# Patient Record
Sex: FEMALE | Race: WHITE
[De-identification: names, ages, dates, MRNs, and addresses within clinical notes are randomized per-mention and may not be internally consistent; named-entity substitution may affect disease eponyms.]

---

## 2017-10-20 ENCOUNTER — HOSPITAL ENCOUNTER (OUTPATIENT)
Dept: HOSPITAL 75 - RAD | Age: 62
End: 2017-10-20
Attending: FAMILY MEDICINE
Payer: COMMERCIAL

## 2017-10-20 DIAGNOSIS — Z12.31: Primary | ICD-10-CM

## 2017-10-20 PROCEDURE — 77067 SCR MAMMO BI INCL CAD: CPT

## 2017-10-23 NOTE — DIAGNOSTIC IMAGING REPORT
EXAMINATION:

Bilateral screening mammogram 2D views with tomosynthesis. The

current study was also evaluated with a Computer Aided Detection

(CAD) system.



INDICATION: 

Screening.



PERSONAL HISTORY: 

No current complaints stated on the questionnaire.



COMPARISON: 

12/21/2015.



FINDINGS: 

The breasts are composed of scattered fibroglandular densities.

There are no masses, architectural distortion, or suspicious

cluster of calcifications. Allowing for technique and positional

differences, no suspicious change is seen.



IMPRESSION: 

No significant change.



ACR BI-RADS Category 2: Benign findings.

Result letter will be mailed to the patient.

Note: At least 10% of breast cancer is not imaged by mammography.



 



Dictated by: 



  Dictated on workstation # [id number]

## 2019-03-28 ENCOUNTER — HOSPITAL ENCOUNTER (OUTPATIENT)
Dept: HOSPITAL 75 - RAD | Age: 64
End: 2019-03-28
Attending: FAMILY MEDICINE
Payer: COMMERCIAL

## 2019-03-28 DIAGNOSIS — Z12.31: Primary | ICD-10-CM

## 2019-03-28 PROCEDURE — 77067 SCR MAMMO BI INCL CAD: CPT

## 2019-03-28 NOTE — DIAGNOSTIC IMAGING REPORT
INDICATION: Routine screening.



COMPARISON: Comparison is made with prior mammograms from

10/20/2017 and 12/21/2015.



TECHNIQUE: 2D and 3D bilateral screening mammography was

performed with computer-aided detection (CAD) system.



FINDINGS: Both breasts are heterogeneously dense, limiting the

sensitivity of mammography. The parenchymal pattern is stable. No

dominant mass or malignant-appearing microcalcifications are

seen. The axillae are unremarkable.



IMPRESSION: No mammographic features suspicious for malignancy

are identified.



ACR BI-RADS Category 1: Negative.

Result letter will be mailed to the patient.

Note:  At least 10% of breast cancer is not imaged by

mammography.



Dictated by: 



  Dictated on workstation # [id number]

## 2020-07-20 ENCOUNTER — HOSPITAL ENCOUNTER (OUTPATIENT)
Dept: HOSPITAL 75 - RAD | Age: 65
End: 2020-07-20
Attending: FAMILY MEDICINE
Payer: COMMERCIAL

## 2020-07-20 DIAGNOSIS — Z12.31: Primary | ICD-10-CM

## 2020-07-20 PROCEDURE — 77063 BREAST TOMOSYNTHESIS BI: CPT

## 2020-07-20 PROCEDURE — 77067 SCR MAMMO BI INCL CAD: CPT

## 2020-07-20 NOTE — DIAGNOSTIC IMAGING REPORT
INDICATION: 

Routine screening.



COMPARISON:  

03/28/2019 and 10/20/2017.



TECHNIQUE: 

2D and 3D bilateral screening mammography was performed with CAD.



FINDINGS:

Both breasts are heterogeneously dense, limiting the sensitivity

of mammography. No mass or malignant appearing

microcalcifications are seen. The axillae are unremarkable.



IMPRESSION: 

No mammographic features suspicious for malignancy are

identified.



ACR BI-RADS Category 1: Negative.

Result letter will be mailed to the patient.

Note:  At least 10% of breast cancer is not imaged by

mammography.



Dictated by: 



  Dictated on workstation # [id number]

## 2021-07-07 ENCOUNTER — HOSPITAL ENCOUNTER (OUTPATIENT)
Dept: HOSPITAL 75 - RAD | Age: 66
End: 2021-07-07
Attending: FAMILY MEDICINE
Payer: COMMERCIAL

## 2021-07-07 DIAGNOSIS — N28.1: Primary | ICD-10-CM

## 2021-07-07 PROCEDURE — 76770 US EXAM ABDO BACK WALL COMP: CPT

## 2021-07-07 NOTE — DIAGNOSTIC IMAGING REPORT
PROCEDURE: US Renal Bilateral.



TECHNIQUE: Multiple real-time grayscale images were obtained over

the kidneys in various projections bilaterally.



INDICATION: Renal insufficiency



The right kidney measures 10.5 cm in length. Cortex is

well-maintained. There is no mass, calculus or hydronephrosis

seen.



The left kidney measures 9.5 cm in length. 1.6 cm and a 2.4 cm

cyst on the left. No solid mass, calculus or hydronephrosis is

evident.



Bilateral ureteral jets were not seen but no intrinsic

abnormality of the bladder is seen.



IMPRESSION: Left renal cysts. No acute abnormality is seen.



Dictated by: 



  Dictated on workstation # [id number]

## 2021-07-21 ENCOUNTER — HOSPITAL ENCOUNTER (OUTPATIENT)
Dept: HOSPITAL 75 - RAD | Age: 66
End: 2021-07-21
Attending: FAMILY MEDICINE
Payer: COMMERCIAL

## 2021-07-21 DIAGNOSIS — Z12.31: Primary | ICD-10-CM

## 2021-07-21 PROCEDURE — 77067 SCR MAMMO BI INCL CAD: CPT

## 2021-07-21 PROCEDURE — 77063 BREAST TOMOSYNTHESIS BI: CPT

## 2021-07-21 NOTE — DIAGNOSTIC IMAGING REPORT
Indication: Digital 2-D and 3-D screening mammograms with CAD are

performed. 



COMPARISON: 7/2020, 3/2019 and 10/2017 



FINDINGS: 

Scattered fibroglandular densities present. No mass or

architectural distortion, spiculated lesion or suspicious

calcifications.



IMPRESSION:



Stable negative mammograms



BI-RADS Category 1



ACR BI-RADS Category 1: Negative.

Result letter will be mailed to the patient.

Note:  At least 10% of breast cancer is not imaged by

mammography.



Dictated by: 



  Dictated on workstation # [id number]

## 2022-08-23 ENCOUNTER — HOSPITAL ENCOUNTER (OUTPATIENT)
Dept: HOSPITAL 75 - RAD | Age: 67
End: 2022-08-23
Attending: FAMILY MEDICINE
Payer: MEDICARE

## 2022-08-23 DIAGNOSIS — Z13.820: ICD-10-CM

## 2022-08-23 DIAGNOSIS — M85.80: ICD-10-CM

## 2022-08-23 DIAGNOSIS — Z12.31: Primary | ICD-10-CM

## 2022-08-23 DIAGNOSIS — Z78.0: ICD-10-CM

## 2022-08-23 PROCEDURE — 77063 BREAST TOMOSYNTHESIS BI: CPT

## 2022-08-23 PROCEDURE — 77067 SCR MAMMO BI INCL CAD: CPT

## 2022-08-23 PROCEDURE — 77080 DXA BONE DENSITY AXIAL: CPT

## 2022-08-23 NOTE — DIAGNOSTIC IMAGING REPORT
INDICATION: 

Routine screening.



COMPARISON:    

07/21/2021 and 07/20/2020.



TECHNIQUE: 

2D and 3D bilateral screening mammography was performed with CAD.



FINDINGS:

Both breasts are heterogeneously dense, limiting the sensitivity

of mammography. The parenchymal pattern is stable. No mass or

malignant-appearing microcalcifications are seen. The axillae are

unremarkable.



IMPRESSION: 

No mammographic features suspicious for malignancy are

identified.



ACR BI-RADS Category 1: Negative.

Result letter will be mailed to the patient.

Note:  At least 10% of breast cancer is not imaged by

mammography.



Dictated by: 



  Dictated on workstation # [id number]

## 2022-08-23 NOTE — DIAGNOSTIC IMAGING REPORT
INDICATION: Postmenopausal screening.



COMPARISON: None



FINDINGS:



AP Spine L1-L4:  

[BMD (g/cm2): 1.358] [T-Score: 1.3] [Z-Score: 2.0]

[BMD Previous: na] [BMD % Change: na]



LT Hip Neck:       

[BMD (g/cm2): 0.853] [T-Score: -1.3] [Z-Score: -0.5]



LT Hip Total:       

[BMD (g/cm2):0.897] [T-Score:-0.9] [Z-Score: -0.3]

[BMD Previous: na] [BMD % Change: na]



RT Hip Neck:      

[BMD (g/cm2):0.862] [T-Score:-1.3] [Z-Score:-0.4]



RT Hip Total:      

[BMD (g/cm2):0.834] [T-score:-1.4] [Z-Score:-0.8]

[BMD Previous:na] [BMD % Change:na]



*Indicates significant change from prior examination based on 95%

confidence level.



World Health Organization criteria for BMD interpretation

classify patients as Normal (T-score at or above -1.0),

Osteopenic (T-score between -1.0 and -2.5) or Osteoporotic

(T-score at or below -2.5).



LIMITATIONS AND MODIFICATION:  None.



FRACTURE RISK (FRAX SCORE):

The ten year probability of (%): 

Major Osteoporotic Fracture: [8.3]

Hip Fracture: [0.8]



IMPRESSION:

1. Osteopenia (Low bone mass).

2. Baseline examination.

3. See below National Osteoporosis Foundation guidelines on when

to potentially initiate pharmacologic therapy. 



Based on the National Osteoporosis Foundation Guidelines,

pharmacologic treatment should be initiated in any of the

following, unless clinical conditions suggest otherwise:



*  Any patient with prior fragility fracture of the hip or

vertebrae. A spine fracture indicates 5X risk for subsequent

spine fracture and 2X risk for subsequent hip fracture.



*  Osteoporosis (T-score <-2.5).



*  Postmenopausal women and men age 50 and older with low bone

mass/osteopenia (T-score between -1.0 and -2.5) by DXA and

10-year major osteoporotic fracture greater than 20% or a 10-year

probability of hip fracture greater than 3%. These fracture risks

are supplied above in the FRAX score, if applicable.



*  Clinician judgement and/or patient preferences may indicate

treatment for people with 10-year fracture probabilities above or

below these levels.



Dictated by: 



  Dictated on workstation # [id number]

## 2023-10-14 ENCOUNTER — HOSPITAL ENCOUNTER (EMERGENCY)
Dept: HOSPITAL 75 - ER | Age: 68
Discharge: HOME | End: 2023-10-14
Payer: MEDICARE

## 2023-10-14 VITALS — WEIGHT: 199.96 LBS | HEIGHT: 67.99 IN | BODY MASS INDEX: 30.31 KG/M2

## 2023-10-14 VITALS — SYSTOLIC BLOOD PRESSURE: 151 MMHG | DIASTOLIC BLOOD PRESSURE: 84 MMHG

## 2023-10-14 DIAGNOSIS — E66.9: ICD-10-CM

## 2023-10-14 DIAGNOSIS — N39.0: Primary | ICD-10-CM

## 2023-10-14 LAB
APTT PPP: (no result) S
BACTERIA #/AREA URNS HPF: (no result) /HPF
BILIRUB UR QL STRIP: (no result)
FIBRINOGEN PPP-MCNC: (no result) MG/DL
GLUCOSE UR STRIP-MCNC: (no result) MG/DL
KETONES UR QL STRIP: (no result)
LEUKOCYTE ESTERASE UR QL STRIP: (no result)
NITRITE UR QL STRIP: NEGATIVE
PH UR STRIP: 8.5 [PH] (ref 5–9)
PROT UR QL STRIP: (no result)
RBC #/AREA URNS HPF: (no result) /HPF
SP GR UR STRIP: 1.01 (ref 1.02–1.02)
SQUAMOUS #/AREA URNS HPF: (no result) /HPF
WBC #/AREA URNS HPF: (no result) /HPF

## 2023-10-14 PROCEDURE — 81000 URINALYSIS NONAUTO W/SCOPE: CPT

## 2023-10-14 PROCEDURE — 87088 URINE BACTERIA CULTURE: CPT

## 2023-10-14 PROCEDURE — 87077 CULTURE AEROBIC IDENTIFY: CPT

## 2023-10-14 PROCEDURE — 87186 SC STD MICRODIL/AGAR DIL: CPT

## 2023-10-14 PROCEDURE — 99284 EMERGENCY DEPT VISIT MOD MDM: CPT

## 2023-10-14 NOTE — ED GU-FEMALE
General


Chief Complaint:   - Reproductive


Stated Complaint:  BURNING W URINATION


Nursing Triage Note:  


PT AMB TO RM 3 WITH CC OF BURNING WITH URINATION SINCE APPROX 1900. PT STATES 


SHE FEELS AS IF SHES NOT EMPTYING HER BLADDER. PT DENIES PAIN AT REST


Source:  patient


Exam Limitations:  no limitations





History of Present Illness


Date Seen by Provider:  Oct 14, 2023


Time Seen by Provider:  22:20


Initial Comments


Patient is a 68-year-old female who presents to the emergency room with a chief 

complaint of burning with urination onset suddenly this evening.  She also has h

ad hematuria.  She reportedly has a history of frequent urinary tract 

infections.  She denies fevers, chills.  She is not nauseous.  She states that 

her last urinary tract infection was approximately 4 months ago.  She reports 

suprapubic abdominal discomfort and feeling like she is not adequately emptying 

her bladder.  No vaginal complaints.  No constipation.


Timing/Duration:  just prior to arrival


Severity/Quality:  severe, aching, burning


Location:  vaginal, urethral


Radiation:  none


Activities at Onset:  eating (at dinner)


Prior Genitourinary Problems:  similar symptoms


Associated Symptoms:  dysuria, lower back pain ((more chronic))





Allergies and Home Medications


Allergies


Coded Allergies:  


     No Known Drug Allergies (Verified , 10/15/08)





Patient Home Medication List


Home Medication List Reviewed:  Yes


Acetaminophen (Tylenol Extra Strength) 500 Mg Tablet, 500-1,000 MG PO Q8H PRN 

for PAIN-MILD (1-4), (Reported)


   Entered as Reported by: MIMI ALBERTO on 23 1053


Amoxicillin/Potassium Clav (Amox Tr-K Clv 875-125 mg Tab) 875 Mg-125 Mg Tablet, 

1 EACH PO BID


   Prescribed by: BUBBA LYNN on 23 1157


Aspirin (Aspirin EC) 81 Mg Tablet.dr, 81 MG PO DAILY, (Reported)


   Entered as Reported by: MIMI ALBERTO on 23 1053


Carvedilol (Carvedilol) 12.5 Mg Tablet, 12.5 MG PO BID, (Reported)


   Entered as Reported by: MIMI ALBERTO on 23 1053


Cefdinir (Cefdinir) 300 Mg Capsule, 300 MG PO BID


   Prescribed by: GLORIA MONACO on 10/14/23 5256


Doxycycline Hyclate (Doxycycline Hyclate) 100 Mg Tablet, 100 MG PO BID


   Prescribed by: BUBBA LYNN on 23 1157


Corinth-3/Dha/Epa/Fish Oil (Fish Oil 500 mg Softgel) 60 Mg-90 Mg-500 Mg Capsule, 1

EACH PO DAILY, (Reported)


   Entered as Reported by: MIMI ALBERTO on 23 1053


Pantoprazole Sodium (Pantoprazole Sodium) 40 Mg Tablet.dr, 40 MG PO 1200, 

(Reported)


   Entered as Reported by: MIMI ALBERTO on 23 1053


Potassium Chloride (K-Tab ER) 10 Meq Tablet.er, 20 MEQ PO DAILY, (Reported)


   Entered as Reported by: MIMI ALBERTO on 23 1053


Simvastatin (Simvastatin) 40 Mg Tablet, 40 MG PO HS, (Reported)


   Entered as Reported by: MIMI ALBERTO on 23 1053


Telmisartan/Hydrochlorothiazid (Telmisartan-Hctz 40-12.5 mg Tb) 40 Mg-12.5 Mg 

Tablet, 1 EA PO 1400, (Reported)


   Entered as Reported by: MIMI ALBERTO on 23 1053





Review of Systems


Review of Systems


Constitutional:  see HPI


EENTM:  no symptoms reported


Respiratory:  no symptoms reported


Cardiovascular:  no symptoms reported


Gastrointestinal:  no symptoms reported


Genitourinary:  burning, frequency, hematuria


Pregnant:  No


Musculoskeletal:  no symptoms reported


Skin:  no symptoms reported


Psychiatric/Neurological:  Anxiety





Past Medical-Social-Family Hx


Patient Social History


Tobacco Use?:  No


Substance use?:  No


Alcohol Use?:  Yes


Alcohol Frequency:  Once in a while





Immunizations Up To Date


First/Initial COVID19 Vaccinat:  VACCINATED


Second COVID19 Vaccination Jarett:  VACCINATED


Third COVID19 Vaccination Date:  VACCINATED





Seasonal Allergies


Seasonal Allergies:  No





Past Medical History


Surgery/Hospitalization HX:  


GERD, CHOLESTEROL, CARDIAC STENTS, HYSTER


Surgeries:  Yes (LAVH/BSO BY DR. DEUTSCH ;CATH--STENTS X 2;EGD/COLONOSCOPY)


Cardiac,  Section, Coronary Stent, Hysterectomy, Oophorectomy


Respiratory:  No


Cardiac:  Yes


Coronary Artery Disease, High Cholesterol, Hypertension


Neurological:  No


Reproductive Disorders:  Yes


Female Reproductive Disorders:  Menstrual Problems


GYN History:  Hysterectomy, Menopausal


Sexually Transmitted Disease:  No


Genitourinary:  No


Gastrointestinal:  Yes


Gastroesophageal Reflux, Diverticulosis, Polyps, Hiatal Hernia


Musculoskeletal:  No


Endocrine:  No


HEENT:  No


Cancer:  No


Psychosocial:  No


Integumentary:  No


Blood Disorders:  No


Adverse Reaction/Blood Tranf:  No





Family Medical History


Heart Disease





Physical Exam


Vital Signs





Vital Signs - First Documented








 10/14/23





 22:06


 


Temp 37.1


 


Pulse 97


 


Resp 16


 


B/P (MAP) 172/109 (130)


 


Pulse Ox 99


 


O2 Delivery Room Air





Capillary Refill : Less Than 3 Seconds


Height, Weight, BMI


Height: 5'8"


Weight: 202lbs. oz. 91.463045cy; 30.00 BMI


Method:Stated


General Appearance:  WD/WN, mild distress, obese


HEENT:  PERRL/EOMI


Cardiovascular:  regular rate, rhythm


Respiratory:  lungs clear, normal breath sounds, no respiratory distress, no 

accessory muscle use


Gastrointestinal:  non tender, soft


Back:  no CVA tenderness


Extremities:  normal range of motion, normal inspection


Neurologic/Psychiatric:  alert, oriented x 3, other (anxious)


Skin:  normal color, warm/dry





Progress/Results/Core Measures


Suspected Sepsis


SIRS


Temperature: 


Pulse: 97 


Respiratory Rate: 16


 


Blood Pressure 172 /109 


Mean: 130





Results/Orders


Lab Results





Laboratory Tests








Test


 10/14/23


22:06 Range/Units


 


 


Urine Color RED H  


 


Urine Clarity TURBID   


 


Urine pH 8.5  5-9  


 


Urine Specific Gravity 1.010 L 1.016-1.022  


 


Urine Protein 3+ H NEGATIVE  


 


Urine Glucose (UA) TRACE H NEGATIVE  


 


Urine Ketones 2+ H NEGATIVE  


 


Urine Nitrite NEGATIVE  NEGATIVE  


 


Urine Bilirubin 3+ H NEGATIVE  


 


Urine Urobilinogen 4.0  < = 1.0  MG/DL


 


Urine Leukocyte Esterase 3+ H NEGATIVE  


 


Urine RBC (Auto) 3+ H NEGATIVE  


 


Urine RBC TNTC H  /HPF


 


Urine WBC 25-50 H  /HPF


 


Urine Squamous Epithelial


Cells 0-2 


  /HPF





 


Urine Crystals NONE   /LPF


 


Urine Bacteria MODERATE H  /HPF


 


Urine Casts NONE   /LPF


 


Urine Mucus SMALL H  /LPF


 


Urine Culture Indicated YES   








My Orders





Orders - GLORIA MONACO MD


Ua Culture If Indicated (10/14/23 22:11)


Phenazopyridine Tablet (Phenazopyridine (10/14/23 22:30)


Urine Culture (10/14/23 22:06)


Ceftriaxone  Iv/Im (Ceftriaxone  Iv/Im) (10/14/23 23:00)


Lidocaine 1% Inj 20 Ml (Xylocaine 1% Inj (10/14/23 23:00)


Lidocaine 1% Inj 10 Ml (Xylocaine 1% Inj (10/14/23 23:02)





Medications Given in ED





Vital Signs/I&O


Capillary Refill : Less Than 3 Seconds








Blood Pressure Mean:                    130








Progress Note :  


   Time:  23:26


Progress Note


Patient seen and evaluated by me.  Evaluation today includes physical exam, 

urinalysis.  Pertinent physical exam findings well-developed well-nourished 

68-year-old female in mild distress due to bladder spasm/pain.  Her vital signs 

are stable.  She is afebrile.  Alert and oriented.  Abdominal exam reveals some 

mild tenderness in the suprapubic region without involuntary guarding or rebound

tenderness.  Normal bowel sounds.





Differential diagnosis includes urinary tract infection, renal stone, 

pyelonephritis.





Patient's labs independently reviewed and interpreted by me.  Patient has 

grossly bloody urine specimen with too numerous to count red blood cells.  White

blood cells are present, the urine is nitrite negative.  She also has bilirubin 

noted in the urine as well as 2+ protein and 3+ ketones.  She is afebrile, not 

tachycardic.  She had no CVA tenderness.  Is not vomiting.  Low clinical 

suspicion for pyelonephritis at this time.  No pain consistent with kidney 

stone. Patient was treated with 1 g of Rocephin IM.  Will be placed on cefdinir.

 Encouraged oral hydration, cranberry juice.  She was also given a dose of 

Pyridium orally.  Advised follow-up with her primary care physician this week.  

Return precautions provided in both verbal and written format.  All questions 

are sought and answered.  Patient is stable for discharge





Departure


Impression





   Primary Impression:  


   Urinary tract infection


   Qualified Codes:  N30.01 - Acute cystitis with hematuria


Disposition:   HOME, SELF-CARE


Condition:  Improved





Departure-Patient Inst.


Decision time for Depature:  23:24


Referrals:  


NNAMDI LOCKE DO (PCP/Family)


Primary Care Physician


Patient Instructions:  Urinary Tract Infection, Adult ED





Add. Discharge Instructions:  


Drink plenty of fluids including electrolyte water, Pedialyte to stay hydrated.





Start taking the antibiotics prescribed tomorrow evening.  Take for 7 days.





If you develop a fever after 24 to 48 hours on antibiotics, worsening pain, 

vomiting or any other worsening symptoms please come back to the emergency room 

for reevaluation.





Your urine culture will be available in a couple of days.  If we need to change 

her antibiotics we will contact you.





Please follow-up with your primary care physician in a week


Scripts


Cefdinir (Cefdinir) 300 Mg Capsule


300 MG PO BID, #14 CAP 0 Refills


   Prov: GLORIA MONACO MD         10/14/23





Copy


Copies To 1:   NNAMDI LOCKE KATHRYN M MD         Oct 14, 2023 22:32

## 2023-10-17 ENCOUNTER — HOSPITAL ENCOUNTER (OUTPATIENT)
Dept: HOSPITAL 75 - RAD | Age: 68
End: 2023-10-17
Attending: FAMILY MEDICINE
Payer: COMMERCIAL

## 2023-10-17 DIAGNOSIS — Z12.31: Primary | ICD-10-CM

## 2023-10-17 PROCEDURE — 77067 SCR MAMMO BI INCL CAD: CPT

## 2023-10-17 PROCEDURE — 77063 BREAST TOMOSYNTHESIS BI: CPT

## 2023-10-17 NOTE — DIAGNOSTIC IMAGING REPORT
INDICATION: 

Routine screening.



COMPARISON: 

08/23/2022 and 07/21/2021.



TECHNIQUE: 

2D and 3D bilateral screening mammography was performed with CAD.



FINDINGS:

Both breasts are heterogeneously dense, limiting the sensitivity

of mammography. The parenchymal pattern is stable. No mass or

malignant-appearing microcalcifications are seen. The axillae are

unremarkable.



IMPRESSION: 

No mammographic features suspicious for malignancy are

identified.



ACR BI-RADS Category 1: Negative.

Result letter will be mailed to the patient.

Note:  At least 10% of breast cancer is not imaged by

mammography.



Dictated by: 



  Dictated on workstation # [id number]